# Patient Record
Sex: FEMALE | Race: WHITE | URBAN - METROPOLITAN AREA
[De-identification: names, ages, dates, MRNs, and addresses within clinical notes are randomized per-mention and may not be internally consistent; named-entity substitution may affect disease eponyms.]

---

## 2021-11-29 PROBLEM — E16.2 HYPOGLYCEMIA: Status: ACTIVE | Noted: 2021-11-29

## 2021-11-30 ENCOUNTER — OFFICE VISIT (OUTPATIENT)
Dept: ENDOCRINOLOGY | Age: 31
End: 2021-11-30
Payer: COMMERCIAL

## 2021-11-30 VITALS
BODY MASS INDEX: 31.22 KG/M2 | DIASTOLIC BLOOD PRESSURE: 70 MMHG | RESPIRATION RATE: 14 BRPM | WEIGHT: 206 LBS | OXYGEN SATURATION: 98 % | HEIGHT: 68 IN | SYSTOLIC BLOOD PRESSURE: 118 MMHG | TEMPERATURE: 98 F | HEART RATE: 76 BPM

## 2021-11-30 DIAGNOSIS — Z98.84 HISTORY OF ROUX-EN-Y GASTRIC BYPASS: ICD-10-CM

## 2021-11-30 DIAGNOSIS — E16.2 HYPOGLYCEMIA: Primary | ICD-10-CM

## 2021-11-30 DIAGNOSIS — E66.9 CLASS 1 OBESITY WITH BODY MASS INDEX (BMI) OF 31.0 TO 31.9 IN ADULT, UNSPECIFIED OBESITY TYPE, UNSPECIFIED WHETHER SERIOUS COMORBIDITY PRESENT: ICD-10-CM

## 2021-11-30 PROCEDURE — 99205 OFFICE O/P NEW HI 60 MIN: CPT | Performed by: INTERNAL MEDICINE

## 2021-11-30 PROCEDURE — G8427 DOCREV CUR MEDS BY ELIG CLIN: HCPCS | Performed by: INTERNAL MEDICINE

## 2021-11-30 PROCEDURE — G8484 FLU IMMUNIZE NO ADMIN: HCPCS | Performed by: INTERNAL MEDICINE

## 2021-11-30 PROCEDURE — 1036F TOBACCO NON-USER: CPT | Performed by: INTERNAL MEDICINE

## 2021-11-30 PROCEDURE — G8417 CALC BMI ABV UP PARAM F/U: HCPCS | Performed by: INTERNAL MEDICINE

## 2021-11-30 RX ORDER — ARIPIPRAZOLE 2 MG/1
2 TABLET ORAL DAILY
COMMUNITY
Start: 2021-10-13

## 2021-11-30 RX ORDER — ACARBOSE 50 MG/1
100 TABLET ORAL
COMMUNITY
Start: 2021-11-10

## 2021-11-30 RX ORDER — RISPERIDONE 0.5 MG/1
0.5 TABLET, FILM COATED ORAL 2 TIMES DAILY
COMMUNITY
Start: 2021-10-13

## 2021-11-30 RX ORDER — PROGESTERONE 200 MG/1
200 CAPSULE ORAL DAILY
COMMUNITY
Start: 2021-07-28

## 2021-11-30 RX ORDER — M-VIT,TX,IRON,MINS/CALC/FOLIC 27MG-0.4MG
1 TABLET ORAL DAILY
COMMUNITY

## 2021-11-30 RX ORDER — ESCITALOPRAM OXALATE 20 MG/1
20 TABLET ORAL DAILY
COMMUNITY
Start: 2021-10-13

## 2021-11-30 RX ORDER — PSEUDOEPHEDRINE HCL 30 MG
500 TABLET ORAL DAILY
COMMUNITY

## 2021-11-30 RX ORDER — FERROUS SULFATE 325(65) MG
30 TABLET ORAL DAILY
COMMUNITY

## 2021-11-30 RX ORDER — PANTOPRAZOLE SODIUM 40 MG/1
20 TABLET, DELAYED RELEASE ORAL DAILY
COMMUNITY
Start: 2021-10-23

## 2021-11-30 RX ORDER — MULTIVIT-MIN/IRON/FOLIC ACID/K 18-600-40
CAPSULE ORAL
COMMUNITY

## 2021-11-30 RX ORDER — CHOLECALCIFEROL (VITAMIN D3) 125 MCG
500 CAPSULE ORAL DAILY
COMMUNITY

## 2021-11-30 NOTE — PROGRESS NOTES
SUBJECTIVE:  Courtney Beltran is a 27 y.o. female who is being evaluated for hypoglycemia. 1. Hypoglycemia  This started in 2021. Patient was diagnosed with hypoglycemia. The problem has been gradually worsening. Patient started medication in 2021. Currently patient is on: acarbose. Misses 0 doses a month. In 2019 had Terrie-en-Y gastric bypass surgery  1/2021 started having hypoglycemia  Increased to daily  Saw   Started diet, acarbose, helped  Hypoglycemia was controlled for a while. In 8/2021 without obvious reason she developed severe hypoglycemia episode. Blood glucose was in the 50s, and DrKaryna Eating it did not come up appropriately. She was treated in the hospital.  It was difficult to correct low blood glucose. Patient eats 50 g of carbohydrates a day. Her acarbose was increased. Later she felt better in 1 week. Symptoms during hypoglycemic episodes include confusion, shakiness, dizziness, feeling hungry. Her symptoms are severe. She feels that in 15 minutes, but not immediately. Episodes usually happen 2 hours after meal.  She has never passed out. Patient had episode caused by alcohol and totally stopped drinking alcohol completely. Currently her carbose dose is 100 mg 3 times daily. Patient lost 150 pounds after gastric bypass surgery. Current complaints: Confusion, shakiness, dizziness, feeling hungry during episodes. 2. History of Terrie-en-Y gastric bypass  In 2019 had Terrie-en-Y gastric bypass surgery  1/2021 started having hypoglycemia    3.  Class 1 obesity with body mass index (BMI) of 31.0 to 31.9 in adult, unspecified obesity type, unspecified whether serious comorbidity present  Eats healthy, active      Past Medical History:   Diagnosis Date    Hypoglycemia      Patient Active Problem List    Diagnosis Date Noted    History of Terrie-en-Y gastric bypass 12/03/2021    Class 1 obesity with body mass index (BMI) of 31.0 to 31.9 in adult 12/03/2021    Hypoglycemia 11/29/2021     Past Surgical History:   Procedure Laterality Date    FANY-EN-Y GASTRIC BYPASS  01/11/2019     Family History   Problem Relation Age of Onset    Diabetes type 2  Maternal Grandmother     Diabetes type 2  Maternal Grandfather      Social History     Socioeconomic History    Marital status:      Spouse name: None    Number of children: None    Years of education: None    Highest education level: None   Occupational History    None   Tobacco Use    Smoking status: Never Smoker    Smokeless tobacco: Never Used   Vaping Use    Vaping Use: Never used   Substance and Sexual Activity    Alcohol use: Not Currently    Drug use: Never    Sexual activity: None   Other Topics Concern    None   Social History Narrative    None     Social Determinants of Health     Financial Resource Strain:     Difficulty of Paying Living Expenses: Not on file   Food Insecurity:     Worried About Running Out of Food in the Last Year: Not on file    Dennise of Food in the Last Year: Not on file   Transportation Needs:     Lack of Transportation (Medical): Not on file    Lack of Transportation (Non-Medical):  Not on file   Physical Activity:     Days of Exercise per Week: Not on file    Minutes of Exercise per Session: Not on file   Stress:     Feeling of Stress : Not on file   Social Connections:     Frequency of Communication with Friends and Family: Not on file    Frequency of Social Gatherings with Friends and Family: Not on file    Attends [employer] Services: Not on file    Active Member of Clubs or Organizations: Not on file    Attends Club or Organization Meetings: Not on file    Marital Status: Not on file   Intimate Partner Violence:     Fear of Current or Ex-Partner: Not on file    Emotionally Abused: Not on file    Physically Abused: Not on file    Sexually Abused: Not on file   Housing Stability:     Unable to Pay for Housing in the Last Year: Not on file    Number of [place] in the Last Year: Not on file    Unstable Housing in the Last Year: Not on file     Current Outpatient Medications   Medication Sig Dispense Refill    acarbose (PRECOSE) 50 MG tablet Take 100 mg by mouth 3 times daily (with meals)      risperiDONE (RISPERDAL) 0.5 MG tablet Take 0.5 mg by mouth 2 times daily      escitalopram (LEXAPRO) 20 MG tablet Take 20 mg by mouth daily      ARIPiprazole (ABILIFY) 2 MG tablet Take 2 mg by mouth daily      pantoprazole (PROTONIX) 40 MG tablet Take 20 mg by mouth daily      progesterone (PROMETRIUM) 200 MG CAPS capsule Take 200 mg by mouth daily      ferrous sulfate (IRON 325) 325 (65 Fe) MG tablet 30 mg daily      calcium citrate (CALCITRATE) 250 MG TABS tablet Take 500 mg by mouth daily      Multiple Vitamins-Minerals (THERAPEUTIC MULTIVITAMIN-MINERALS) tablet Take 1 tablet by mouth daily      vitamin B-12 (CYANOCOBALAMIN) 500 MCG tablet Take 500 mcg by mouth daily      Cholecalciferol (VITAMIN D) 50 MCG (2000 UT) CAPS capsule Take by mouth       No current facility-administered medications for this visit. Allergies   Allergen Reactions    Latex Rash    Diazepam Nausea Only and Nausea And Vomiting     Other reaction(s):  Intolerance  Nausea       Family Status   Relation Name Status    MGM  (Not Specified)    MGF  (Not Specified)       Review of Systems:  Constitutional: no fatigue, no fever, no recent weight gain, no recent weight loss, no changes in appetite  Eyes: no eye pain, no change in vision, no eye redness, no eye irritation, no double vision  Ears, nose, throat: has nasal congestion, no sore throat, no earache, no decrease in hearing, no hoarseness, no dry mouth, has sinus problems, no difficulty swallowing, no neck lumps, no dental problems, no mouth sores, has ringing in ears  Pulmonary: no shortness of breath, no wheezing, no dyspnea on exertion, no cough  Cardiovascular: no chest pain, no lower extremity edema, no orthopnea, no intermittent leg claudication, no palpitations  Gastrointestinal: no abdominal pain, no nausea, no vomiting, no diarrhea, has constipation, no dysphagia, has heartburn, no bloating  Genitourinary: no dysuria, no urinary incontinence, no urinary hesitancy, has urinary frequency, no feelings of urinary urgency, no nocturia  Musculoskeletal: no joint swelling, no joint stiffness, no joint pain, no muscle cramps, no muscle pain  Integument/Breast: no hair loss, no skin rashes, no skin lesions, no itching, no dry skin  Neurological: no numbness, no tingling, no weakness, no confusion, no headaches, no dizziness, no fainting, no tremors, no decrease in memory, no balance problems  Psychiatric: has anxiety, has depression, no insomnia  Hematologic/Lymphatic: no tendency for easy bleeding, no swollen lymph nodes, no tendency for easy bruising  Immunology: has seasonal allergies, no frequent infections, no frequent illnesses  Endocrine: no temperature intolerance    /70   Pulse 76   Temp 98 °F (36.7 °C)   Resp 14   Ht 5' 8\" (1.727 m)   Wt 206 lb (93.4 kg)   SpO2 98%   BMI 31.32 kg/m²    Wt Readings from Last 3 Encounters:   11/30/21 206 lb (93.4 kg)     Body mass index is 31.32 kg/m².     OBJECTIVE:  Constitutional: no acute distress, well appearing and well nourished  Psychiatric: oriented to person, place and time, judgement and insight and normal, recent and remote memory and intact and mood and affect are normal  Skin: skin and subcutaneous tissue is normal without mass, normal turgor  Head and Face: examination of head and face revealed no abnormalities  Eyes: no lid or conjunctival swelling, erythema or discharge, pupils are normal, equal, round, reactive to light  Ears/Nose: external inspection of ears and nose revealed no abnormalities, hearing is grossly normal  Oropharynx/Mouth/Face: lips, tongue and gums are normal with no lesions, the voice quality was normal  Neck: neck is supple and symmetric, with midline trachea and no masses, thyroid is normal  Lymphatics: normal cervical lymph nodes, normal supraclavicular nodes  Pulmonary: no increased work of breathing or signs of respiratory distress, lungs are clear to auscultation  Cardiovascular: normal heart rate and rhythm, normal S1 and S2, no murmurs and pedal pulses and 2+ bilaterally, No edema  Abdomen: abdomen is soft, non-tender with no masses  Musculoskeletal: normal gait and station and exam of the digits and nails are normal  Neurological: normal coordination and normal general cortical function      Lab Review:    No results found for: WBC, HGB, HCT, MCV, PLT  No results found for: NA, K, CL, CO2, BUN, CREATININE, GLUCOSE, CALCIUM, PROT, LABALBU, BILITOT, ALKPHOS, AST, ALT, LABGLOM, GFRAA, AGRATIO, GLOB  No results found for: TSHFT4, TSH, FT3  No results found for: LABA1C  No results found for: EAG  No results found for: CHOL  No results found for: TRIG  No results found for: HDL  No results found for: LDLCHOLESTEROL, LDLCALC  No results found for: LABVLDL, VLDL  No results found for: CHOLHDLRATIO  No results found for: LABMICR, [id number]  No results found for: LFWD17     ASSESSMENT/PLAN:  1. Hypoglycemia  Continue acarbose, eat frequent small meals, include protein and healthy fats  Episodes currently happen once in 2 weeks in average. Severe hypoglycemic episodes happened after the patient traveled and changed time zone, also worked third shift. She is seeking consultation in order to be proactive and avoid these episodes. Advised patient [facility]. Patient is currently on very healthy diet. Advised patient that there is no  - acarbose (PRECOSE) 50 MG tablet; Take 100 mg by mouth 3 times daily (with meals)  - C-Peptide; Future  - Proinsulin; Future  - Insulin, total; Future  - Comprehensive Metabolic Panel; Future  - ACTH; Future  - Cortisol AM, Total; Future  - Insulin, total; Future  - C-Peptide;  Future  - Proinsulin; Future  - Comprehensive Metabolic Panel; Future  - External Referral to Endocrinology    2. History of Terrie-en-Y gastric bypass  Malabsorption related to gastric Terrie-en-Y gastric bypass surgery  - acarbose (PRECOSE) 50 MG tablet; Take 100 mg by mouth 3 times daily (with meals)  - ferrous sulfate (IRON 325) 325 (65 Fe) MG tablet; 30 mg daily  - calcium citrate (CALCITRATE) 250 MG TABS tablet; Take 500 mg by mouth daily  - Multiple Vitamins-Minerals (THERAPEUTIC MULTIVITAMIN-MINERALS) tablet; Take 1 tablet by mouth daily  - vitamin B-12 (CYANOCOBALAMIN) 500 MCG tablet; Take 500 mcg by mouth daily  - Cholecalciferol (VITAMIN D) 50 MCG (2000 UT) CAPS capsule; Take by mouth  - C-Peptide; Future  - Proinsulin; Future  - Insulin, total; Future  - Comprehensive Metabolic Panel; Future  - ACTH; Future  - Cortisol AM, Total; Future  - Insulin, total; Future  - C-Peptide; Future  - Proinsulin; Future  - Comprehensive Metabolic Panel; Future  - External Referral to Endocrinology    3. Class 1 obesity with body mass index (BMI) of 31.0 to 31.9 in adult, unspecified obesity type, unspecified whether serious comorbidity present    usually haoppens rare  Once in 2 weeks  Always reason  After tracveling, changed time znnoes from working night shift  Diet was good    Reviewed and/or ordered clinical lab results Yes  Reviewed and/or ordered radiology tests Yes   Reviewed and/or ordered other diagnostic tests No  Discussed test results with performing physician No  Independently reviewed image, tracing, or specimen No  Made a decision to obtain old records No  Reviewed and summarized old records Yes   Blood glucose ranges from 77-93 while in the hospital  Reviewed [facility] hospitalization records.   Potassium 4.4  Sodium 139  Glucose 88  Creatinine 0.7  GFR more than 60  IGF-I 269  C-peptide 2.29  Glucose 88  Proinsulin 1.7  Sulfonylurea screen negative  Obtained history from other than patient Yes    Courtney Beltran was counseled regarding symptoms of hypoglycemia, Terrie-en-Y gastric bypass surgery diagnosis, course and complications of disease if inadequately treated, side effects of medications, diagnosis, treatment options, and prognosis, risks, benefits, complications, and alternatives of treatment, labs, imaging and other studies and treatment targets and goals, postsurgical complications after gastric bypass surgeries, hypoglycemia after Terrie-en-Y bypass surgery, management options, monitoring, diet. She understands instructions and counseling. Total time I spent for this encounter 60 minutes    Return in about 3 months (around 2/28/2022) for hypoglycemia.     Electronically signed by Jake Stoner MD on 12/3/2021 at 10:43 PM

## 2021-12-03 PROBLEM — E66.811 CLASS 1 OBESITY WITH BODY MASS INDEX (BMI) OF 31.0 TO 31.9 IN ADULT: Status: ACTIVE | Noted: 2021-12-03

## 2021-12-03 PROBLEM — Z98.84 HISTORY OF ROUX-EN-Y GASTRIC BYPASS: Status: ACTIVE | Noted: 2021-12-03

## 2021-12-03 PROBLEM — E66.9 CLASS 1 OBESITY WITH BODY MASS INDEX (BMI) OF 31.0 TO 31.9 IN ADULT: Status: ACTIVE | Noted: 2021-12-03

## 2025-03-12 ENCOUNTER — TELEPHONE (OUTPATIENT)
Dept: CARDIOLOGY CLINIC | Age: 35
End: 2025-03-12

## 2025-03-12 NOTE — TELEPHONE ENCOUNTER
New Patient Referral    Referring Provider Name:  Kalina Morrison   Phone Number:  [phone]    Fax Number:  Address:  [address] [place] [address] [address], [place] [address]     Diagnosis/Reason for Visit:  Heart palpitations/tachycardia     Cardiac Clearance? no    Cardiac Testing: (Yes/No/Unsure)     Date testing was completed?___________      Have records been requested? (Yes/No)    Preferred Language: English    LM for pt to call and schedule w/cardiology.  Needs to see EP doctor

## 2025-05-02 ENCOUNTER — TELEPHONE (OUTPATIENT)
Age: 35
End: 2025-05-02

## 2025-05-02 NOTE — TELEPHONE ENCOUNTER
Called pt in regards to appointment with Riccardo on 5/20 to reschedule. Let her know to call back to do so.

## 2025-05-09 NOTE — TELEPHONE ENCOUNTER
Attempted to call patient, no answer. LMOM to call back.     Appt on 5/20 with [facility] needs to be rescheduled due to change in schedule.      [see HPI] : see HPI [Negative] : Heme/Lymph